# Patient Record
Sex: MALE | HISPANIC OR LATINO | URBAN - METROPOLITAN AREA
[De-identification: names, ages, dates, MRNs, and addresses within clinical notes are randomized per-mention and may not be internally consistent; named-entity substitution may affect disease eponyms.]

---

## 2018-04-01 ENCOUNTER — OUTPATIENT (OUTPATIENT)
Dept: OUTPATIENT SERVICES | Facility: HOSPITAL | Age: 63
LOS: 1 days | End: 2018-04-01
Payer: MEDICAID

## 2018-04-01 PROCEDURE — G9001: CPT

## 2018-04-14 ENCOUNTER — EMERGENCY (EMERGENCY)
Facility: HOSPITAL | Age: 63
LOS: 1 days | Discharge: HOME | End: 2018-04-14
Attending: EMERGENCY MEDICINE
Payer: MEDICAID

## 2018-04-14 VITALS
HEART RATE: 99 BPM | WEIGHT: 147.93 LBS | SYSTOLIC BLOOD PRESSURE: 145 MMHG | RESPIRATION RATE: 22 BRPM | HEIGHT: 64 IN | OXYGEN SATURATION: 97 % | TEMPERATURE: 98 F | DIASTOLIC BLOOD PRESSURE: 93 MMHG

## 2018-04-14 VITALS
HEART RATE: 72 BPM | RESPIRATION RATE: 20 BRPM | OXYGEN SATURATION: 100 % | DIASTOLIC BLOOD PRESSURE: 60 MMHG | SYSTOLIC BLOOD PRESSURE: 106 MMHG

## 2018-04-14 LAB
ANION GAP SERPL CALC-SCNC: 7 MMOL/L — SIGNIFICANT CHANGE UP (ref 5–17)
BASOPHILS # BLD AUTO: 0.1 K/UL — SIGNIFICANT CHANGE UP (ref 0–0.2)
BASOPHILS NFR BLD AUTO: 1.7 % — SIGNIFICANT CHANGE UP (ref 0–2)
BUN SERPL-MCNC: 29 MG/DL — HIGH (ref 7–18)
CALCIUM SERPL-MCNC: 8.9 MG/DL — SIGNIFICANT CHANGE UP (ref 8.4–10.5)
CHLORIDE SERPL-SCNC: 110 MMOL/L — HIGH (ref 96–108)
CO2 SERPL-SCNC: 26 MMOL/L — SIGNIFICANT CHANGE UP (ref 22–31)
CREAT SERPL-MCNC: 1.18 MG/DL — SIGNIFICANT CHANGE UP (ref 0.5–1.3)
EOSINOPHIL # BLD AUTO: 1.2 K/UL — HIGH (ref 0–0.5)
EOSINOPHIL NFR BLD AUTO: 20 % — HIGH (ref 0–6)
GLUCOSE SERPL-MCNC: 99 MG/DL — SIGNIFICANT CHANGE UP (ref 70–99)
HCT VFR BLD CALC: 44.2 % — SIGNIFICANT CHANGE UP (ref 39–50)
HGB BLD-MCNC: 15 G/DL — SIGNIFICANT CHANGE UP (ref 13–17)
LYMPHOCYTES # BLD AUTO: 1.5 K/UL — SIGNIFICANT CHANGE UP (ref 1–3.3)
LYMPHOCYTES # BLD AUTO: 23.6 % — SIGNIFICANT CHANGE UP (ref 13–44)
MCHC RBC-ENTMCNC: 29.1 PG — SIGNIFICANT CHANGE UP (ref 27–34)
MCHC RBC-ENTMCNC: 34 GM/DL — SIGNIFICANT CHANGE UP (ref 32–36)
MCV RBC AUTO: 85.6 FL — SIGNIFICANT CHANGE UP (ref 80–100)
MONOCYTES # BLD AUTO: 0.4 K/UL — SIGNIFICANT CHANGE UP (ref 0–0.9)
MONOCYTES NFR BLD AUTO: 7.1 % — SIGNIFICANT CHANGE UP (ref 2–14)
NEUTROPHILS # BLD AUTO: 2.9 K/UL — SIGNIFICANT CHANGE UP (ref 1.8–7.4)
NEUTROPHILS NFR BLD AUTO: 47.6 % — SIGNIFICANT CHANGE UP (ref 43–77)
PLATELET # BLD AUTO: 207 K/UL — SIGNIFICANT CHANGE UP (ref 150–400)
POTASSIUM SERPL-MCNC: 4 MMOL/L — SIGNIFICANT CHANGE UP (ref 3.5–5.3)
POTASSIUM SERPL-SCNC: 4 MMOL/L — SIGNIFICANT CHANGE UP (ref 3.5–5.3)
RBC # BLD: 5.16 M/UL — SIGNIFICANT CHANGE UP (ref 4.2–5.8)
RBC # FLD: 12.2 % — SIGNIFICANT CHANGE UP (ref 10.3–14.5)
SODIUM SERPL-SCNC: 143 MMOL/L — SIGNIFICANT CHANGE UP (ref 135–145)
WBC # BLD: 6.1 K/UL — SIGNIFICANT CHANGE UP (ref 3.8–10.5)
WBC # FLD AUTO: 6.1 K/UL — SIGNIFICANT CHANGE UP (ref 3.8–10.5)

## 2018-04-14 PROCEDURE — 71046 X-RAY EXAM CHEST 2 VIEWS: CPT | Mod: 26

## 2018-04-14 PROCEDURE — 71046 X-RAY EXAM CHEST 2 VIEWS: CPT

## 2018-04-14 PROCEDURE — 99285 EMERGENCY DEPT VISIT HI MDM: CPT | Mod: 25

## 2018-04-14 PROCEDURE — 99284 EMERGENCY DEPT VISIT MOD MDM: CPT

## 2018-04-14 PROCEDURE — 96374 THER/PROPH/DIAG INJ IV PUSH: CPT

## 2018-04-14 PROCEDURE — 80048 BASIC METABOLIC PNL TOTAL CA: CPT

## 2018-04-14 PROCEDURE — 94640 AIRWAY INHALATION TREATMENT: CPT

## 2018-04-14 PROCEDURE — 85027 COMPLETE CBC AUTOMATED: CPT

## 2018-04-14 RX ORDER — IPRATROPIUM/ALBUTEROL SULFATE 18-103MCG
3 AEROSOL WITH ADAPTER (GRAM) INHALATION ONCE
Qty: 0 | Refills: 0 | Status: COMPLETED | OUTPATIENT
Start: 2018-04-14 | End: 2018-04-14

## 2018-04-14 RX ORDER — ALBUTEROL 90 UG/1
3 AEROSOL, METERED ORAL
Qty: 60 | Refills: 0 | OUTPATIENT
Start: 2018-04-14 | End: 2018-04-18

## 2018-04-14 RX ORDER — ALBUTEROL 90 UG/1
2 AEROSOL, METERED ORAL
Qty: 1 | Refills: 0 | OUTPATIENT
Start: 2018-04-14 | End: 2018-04-20

## 2018-04-14 RX ADMIN — Medication 3 MILLILITER(S): at 11:59

## 2018-04-14 RX ADMIN — Medication 3 MILLILITER(S): at 13:17

## 2018-04-14 RX ADMIN — Medication 125 MILLIGRAM(S): at 11:56

## 2018-04-14 RX ADMIN — Medication 3 MILLILITER(S): at 12:44

## 2018-04-14 NOTE — ED PROVIDER NOTE - PROGRESS NOTE DETAILS
There is much less wheezing, pt is feeling much better, asking to leave. Patient is feeling much better, asking to leave, will dc with meds to follow up with pmd, lungs are cta on exam, he is not tachy or hypoxic. Precautions reviewed.

## 2018-04-14 NOTE — ED PROVIDER NOTE - MUSCULOSKELETAL, MLM
Spine appears normal, range of motion is not limited, no muscle or joint tenderness. No calf tenderness or edema.

## 2018-04-14 NOTE — ED ADULT NURSE NOTE - OBJECTIVE STATEMENT
pt came in with c/o  asthma attack pt states been sick x 3 weeks but this week pt  feels  worse today

## 2018-04-14 NOTE — ED ADULT NURSE NOTE - DOES PATIENT HAVE ADVANCE DIRECTIVE
no loss of consciousness, no gait abnormality, no headache, no sensory deficits, and no weakness. No

## 2018-04-14 NOTE — ED PROVIDER NOTE - OBJECTIVE STATEMENT
62 y/o M pt with PMHx of asthma (8 years, treatment with ventolin and albuterol) presents to ED c/o asthma attack --- wheezing x 3 weeks, worsening this week. Pt denies fever, chills, nausea, vomiting, chest pain, or any other complaints. NKDA.

## 2018-04-19 DIAGNOSIS — R69 ILLNESS, UNSPECIFIED: ICD-10-CM

## 2019-04-01 ENCOUNTER — OUTPATIENT (OUTPATIENT)
Dept: OUTPATIENT SERVICES | Facility: HOSPITAL | Age: 64
LOS: 1 days | End: 2019-04-01
Payer: MEDICAID

## 2019-04-01 PROCEDURE — G9001: CPT

## 2019-04-03 DIAGNOSIS — Z71.89 OTHER SPECIFIED COUNSELING: ICD-10-CM

## 2019-04-03 PROBLEM — J45.909 UNSPECIFIED ASTHMA, UNCOMPLICATED: Chronic | Status: ACTIVE | Noted: 2018-04-14

## 2023-02-14 PROBLEM — Z00.00 ENCOUNTER FOR PREVENTIVE HEALTH EXAMINATION: Status: ACTIVE | Noted: 2023-02-14

## 2023-02-15 ENCOUNTER — NON-APPOINTMENT (OUTPATIENT)
Age: 68
End: 2023-02-15

## 2023-02-15 ENCOUNTER — APPOINTMENT (OUTPATIENT)
Dept: OTOLARYNGOLOGY | Facility: CLINIC | Age: 68
End: 2023-02-15
Payer: MEDICARE

## 2023-02-15 VITALS
WEIGHT: 147 LBS | DIASTOLIC BLOOD PRESSURE: 91 MMHG | HEIGHT: 62 IN | SYSTOLIC BLOOD PRESSURE: 148 MMHG | HEART RATE: 76 BPM | BODY MASS INDEX: 27.05 KG/M2

## 2023-02-15 PROCEDURE — 31575 DIAGNOSTIC LARYNGOSCOPY: CPT

## 2023-02-15 PROCEDURE — 92567 TYMPANOMETRY: CPT

## 2023-02-15 PROCEDURE — 99204 OFFICE O/P NEW MOD 45 MIN: CPT | Mod: 25

## 2023-02-15 PROCEDURE — 92557 COMPREHENSIVE HEARING TEST: CPT

## 2023-02-15 RX ORDER — CIPROFLOXACIN AND DEXAMETHASONE 3; 1 MG/ML; MG/ML
0.3-0.1 SUSPENSION/ DROPS AURICULAR (OTIC)
Qty: 1 | Refills: 2 | Status: ACTIVE | COMMUNITY
Start: 2023-02-15 | End: 1900-01-01

## 2023-02-15 RX ORDER — AMOXICILLIN AND CLAVULANATE POTASSIUM 875; 125 MG/1; MG/1
875-125 TABLET, COATED ORAL
Qty: 20 | Refills: 1 | Status: ACTIVE | COMMUNITY
Start: 2023-02-15 | End: 1900-01-01

## 2023-02-15 NOTE — DATA REVIEWED
[de-identified] : \par -TYMPS: CNT (WET EAR) AD; TYPE A AS\par -AD: ESSENTIALLY SLIGHT SLOPING TO MOD-SEVERE MIXED HEARING LOSS 250-8000 HZ \par -AS: HEARING -3000 HZ, MILD TO MOD SNHL [id number] HZ

## 2023-02-15 NOTE — ASSESSMENT
[FreeTextEntry1] :  CHL RIGHT EAR\par VALSALVA\par AUGMENTIN\par CIPRODEX\par WATER PERCAUTION\par F/U 4 WEEKS

## 2023-02-15 NOTE — REVIEW OF SYSTEMS
[Ear Pain] : ear pain [Ear Drainage] : ear drainage [Ear Noises] : ear noises [Negative] : Heme/Lymph [Patient Intake Form Reviewed] : Patient intake form was reviewed

## 2023-02-15 NOTE — PHYSICAL EXAM
[Normal] : mucosa is normal [Midline] : trachea located in midline position [de-identified] : RETRACTED TM RIGHT MORE THAN LEFT/ OME RIGHT/ IRRITATION

## 2023-02-16 RX ORDER — NEOMYCIN AND POLYMYXIN B SULFATES AND HYDROCORTISONE OTIC 10; 3.5; 1 MG/ML; MG/ML; [USP'U]/ML
3.5-10000-1 SUSPENSION AURICULAR (OTIC) 4 TIMES DAILY
Qty: 1 | Refills: 2 | Status: ACTIVE | COMMUNITY
Start: 2023-02-16 | End: 1900-01-01

## 2023-03-15 ENCOUNTER — APPOINTMENT (OUTPATIENT)
Dept: OTOLARYNGOLOGY | Facility: CLINIC | Age: 68
End: 2023-03-15
Payer: MEDICARE

## 2023-03-15 VITALS
HEIGHT: 62 IN | BODY MASS INDEX: 27.05 KG/M2 | WEIGHT: 147 LBS | HEART RATE: 99 BPM | DIASTOLIC BLOOD PRESSURE: 100 MMHG | SYSTOLIC BLOOD PRESSURE: 155 MMHG

## 2023-03-15 DIAGNOSIS — H66.90 OTITIS MEDIA, UNSPECIFIED, UNSPECIFIED EAR: ICD-10-CM

## 2023-03-15 PROCEDURE — 92567 TYMPANOMETRY: CPT

## 2023-03-15 PROCEDURE — 99214 OFFICE O/P EST MOD 30 MIN: CPT

## 2023-03-15 PROCEDURE — 92557 COMPREHENSIVE HEARING TEST: CPT

## 2023-03-15 NOTE — PHYSICAL EXAM
[Normal] : mucosa is normal [Midline] : trachea located in midline position [de-identified] : SCARRED TM WITH SMALL CENTRAL PERF/ OM IMPROVED

## 2023-03-15 NOTE — ASSESSMENT
[FreeTextEntry1] :  IMPROVED HEARING \par RESIDUAL CHL RIGHT EAR\par F/U 2 MONTHS ; WILL RECHECK  AND CENTRAL PERF\par WATER PERCAUTIONS\par EAR DROPS ANOTHER 3 DAYS

## 2023-05-17 ENCOUNTER — APPOINTMENT (OUTPATIENT)
Dept: OTOLARYNGOLOGY | Facility: CLINIC | Age: 68
End: 2023-05-17
Payer: MEDICARE

## 2023-05-17 VITALS — HEART RATE: 90 BPM | SYSTOLIC BLOOD PRESSURE: 149 MMHG | DIASTOLIC BLOOD PRESSURE: 93 MMHG

## 2023-05-17 DIAGNOSIS — H72.90 UNSPECIFIED PERFORATION OF TYMPANIC MEMBRANE, UNSPECIFIED EAR: ICD-10-CM

## 2023-05-17 DIAGNOSIS — H69.80 OTHER SPECIFIED DISORDERS OF EUSTACHIAN TUBE, UNSPECIFIED EAR: ICD-10-CM

## 2023-05-17 PROCEDURE — 99214 OFFICE O/P EST MOD 30 MIN: CPT

## 2023-05-17 NOTE — PHYSICAL EXAM
[de-identified] : SCARRED TM WITH CENTRAL PERF/ NO INFECTION/ DRY EAR [Normal] : mucosa is normal [Midline] : trachea located in midline position

## 2023-05-17 NOTE — HISTORY OF PRESENT ILLNESS
[de-identified] : RIGHT EAR OM FOLLOW UP\par FEELING BETTER\par HEARING UNCHANGED\par MEDICAL HX REVIEWED

## 2023-05-17 NOTE — ASSESSMENT
[FreeTextEntry1] : RIGHT CENTRAL PERFORATION\par MYRINGOPLASTY DISCUSSED\par WILL REFER TO DR PACKER\par WATER PERCAUTION